# Patient Record
Sex: MALE | Race: WHITE | NOT HISPANIC OR LATINO | Employment: PART TIME | URBAN - METROPOLITAN AREA
[De-identification: names, ages, dates, MRNs, and addresses within clinical notes are randomized per-mention and may not be internally consistent; named-entity substitution may affect disease eponyms.]

---

## 2023-06-26 ENCOUNTER — OFFICE VISIT (OUTPATIENT)
Dept: FAMILY MEDICINE | Facility: CLINIC | Age: 23
End: 2023-06-26
Payer: COMMERCIAL

## 2023-06-26 VITALS
HEART RATE: 82 BPM | SYSTOLIC BLOOD PRESSURE: 129 MMHG | OXYGEN SATURATION: 99 % | HEIGHT: 72 IN | DIASTOLIC BLOOD PRESSURE: 73 MMHG | RESPIRATION RATE: 11 BRPM | WEIGHT: 187.8 LBS | TEMPERATURE: 98.9 F | BODY MASS INDEX: 25.44 KG/M2

## 2023-06-26 DIAGNOSIS — Z23 NEED FOR HPV VACCINE: ICD-10-CM

## 2023-06-26 DIAGNOSIS — F41.9 ANXIETY: ICD-10-CM

## 2023-06-26 DIAGNOSIS — Z00.00 ROUTINE GENERAL MEDICAL EXAMINATION AT A HEALTH CARE FACILITY: Primary | ICD-10-CM

## 2023-06-26 DIAGNOSIS — D22.9 MULTIPLE NEVI: ICD-10-CM

## 2023-06-26 PROCEDURE — 99385 PREV VISIT NEW AGE 18-39: CPT | Mod: 25 | Performed by: FAMILY MEDICINE

## 2023-06-26 PROCEDURE — 90651 9VHPV VACCINE 2/3 DOSE IM: CPT | Performed by: FAMILY MEDICINE

## 2023-06-26 PROCEDURE — 99213 OFFICE O/P EST LOW 20 MIN: CPT | Mod: 25 | Performed by: FAMILY MEDICINE

## 2023-06-26 PROCEDURE — 90471 IMMUNIZATION ADMIN: CPT | Performed by: FAMILY MEDICINE

## 2023-06-26 RX ORDER — MELATONIN 5 MG
TABLET,CHEWABLE ORAL
COMMUNITY

## 2023-06-26 RX ORDER — ESCITALOPRAM OXALATE 5 MG/1
5 TABLET ORAL DAILY
Qty: 30 TABLET | Refills: 1 | Status: SHIPPED | OUTPATIENT
Start: 2023-06-26 | End: 2023-08-20

## 2023-06-26 ASSESSMENT — ENCOUNTER SYMPTOMS
HEMATOCHEZIA: 0
CHILLS: 1
NAUSEA: 1
CONSTIPATION: 0
WEAKNESS: 1
FEVER: 0
NERVOUS/ANXIOUS: 1
DYSURIA: 0
ARTHRALGIAS: 1
DIZZINESS: 0
PARESTHESIAS: 0
EYE PAIN: 0
COUGH: 0
HEARTBURN: 1
FREQUENCY: 0
MYALGIAS: 0
DIARRHEA: 0
HEADACHES: 0
PALPITATIONS: 1
ABDOMINAL PAIN: 0
JOINT SWELLING: 0
SORE THROAT: 0
SHORTNESS OF BREATH: 0
HEMATURIA: 0

## 2023-06-26 ASSESSMENT — ANXIETY QUESTIONNAIRES
GAD7 TOTAL SCORE: 10
GAD7 TOTAL SCORE: 10
7. FEELING AFRAID AS IF SOMETHING AWFUL MIGHT HAPPEN: SEVERAL DAYS
2. NOT BEING ABLE TO STOP OR CONTROL WORRYING: MORE THAN HALF THE DAYS
3. WORRYING TOO MUCH ABOUT DIFFERENT THINGS: MORE THAN HALF THE DAYS
6. BECOMING EASILY ANNOYED OR IRRITABLE: SEVERAL DAYS
IF YOU CHECKED OFF ANY PROBLEMS ON THIS QUESTIONNAIRE, HOW DIFFICULT HAVE THESE PROBLEMS MADE IT FOR YOU TO DO YOUR WORK, TAKE CARE OF THINGS AT HOME, OR GET ALONG WITH OTHER PEOPLE: SOMEWHAT DIFFICULT
5. BEING SO RESTLESS THAT IT IS HARD TO SIT STILL: SEVERAL DAYS
1. FEELING NERVOUS, ANXIOUS, OR ON EDGE: MORE THAN HALF THE DAYS

## 2023-06-26 ASSESSMENT — PATIENT HEALTH QUESTIONNAIRE - PHQ9
5. POOR APPETITE OR OVEREATING: SEVERAL DAYS
SUM OF ALL RESPONSES TO PHQ QUESTIONS 1-9: 6

## 2023-06-26 ASSESSMENT — PAIN SCALES - GENERAL: PAINLEVEL: NO PAIN (0)

## 2023-06-26 NOTE — PATIENT INSTRUCTIONS
HPV vaccine today.    Referral dermatology.     I would recommend a trial of Lexapro 5 mg daily.  This is best taken in AM.  Follow up in 4-6 weeks for recheck mood - this can be a virtual or in person visit.   Consider counseling in the future.        Preventive Health Recommendations  Male Ages 21 - 25     Yearly exam:             See your health care provider every year in order to  o   Review health changes.   o   Discuss preventive care.    o   Review your medicines if your doctor has prescribed any.  You should be tested each year for STDs (sexually transmitted diseases).   Talk to your provider about cholesterol testing.    If you are at risk for diabetes, you should have a diabetes test (fasting glucose).    Shots: Get a flu shot each year. Get a tetanus shot every 10 years.     Nutrition:  Eat at least 5 servings of fruits and vegetables daily.   Eat whole-grain bread, whole-wheat pasta and brown rice instead of white grains and rice.   Get adequate calcium and Vitamin D.     Lifestyle  Exercise for at least 150 minutes a week (30 minutes a day, 5 days a week). This will help you control your weight and prevent disease.   Limit alcohol to one drink per day.   No smoking.   Wear sunscreen to prevent skin cancer.   See your dentist every six months for an exam and cleaning.

## 2023-06-26 NOTE — PROGRESS NOTES
SUBJECTIVE:   CC: Jalen is an 22 year old who presents for preventative health visit.       Healthy Habits:     Getting at least 3 servings of Calcium per day:  Yes    Bi-annual eye exam:  NO    Dental care twice a year:  NO    Sleep apnea or symptoms of sleep apnea:  None    Diet:  Breakfast skipped    Frequency of exercise:  6-7 days/week    Duration of exercise:  Greater than 60 minutes    Taking medications regularly:  Yes    Medication side effects:  None    PHQ-2 Total Score: 2    Additional concerns today:  No    Weight lifting.  - no issues.      Anxiety Follow-Up    How are you doing with your anxiety since your last visit? Worsened     Are you having other symptoms that might be associated with anxiety? No    Have you had a significant life event? No     Are you feeling depressed? No    Do you have any concerns with your use of alcohol or other drugs? No    Social History     Tobacco Use     Smoking status: Never     Smokeless tobacco: Never   Vaping Use     Vaping Use: Never used         6/26/2023    12:36 PM   RUBENS-7 SCORE   Total Score 10         6/26/2023    12:36 PM   PHQ   PHQ-9 Total Score 6   Q9: Thoughts of better off dead/self-harm past 2 weeks Not at all                 6/26/2023    12:36 PM   PHQ   PHQ-9 Total Score 6   Q9: Thoughts of better off dead/self-harm past 2 weeks Not at all         6/26/2023    12:36 PM   RUBENS-7 SCORE   Total Score 10             Have you ever done Advance Care Planning? (For example, a Health Directive, POLST, or a discussion with a medical provider or your loved ones about your wishes): No, advance care planning information given to patient to review.  Patient declined advance care planning discussion at this time.    Social History     Tobacco Use     Smoking status: Never     Smokeless tobacco: Never   Substance Use Topics     Alcohol use: Not on file             6/26/2023    12:30 PM   Alcohol Use   Prescreen: >3 drinks/day or >7 drinks/week? No       Last PSA: No  results found for: PSA    Reviewed orders with patient. Reviewed health maintenance and updated orders accordingly - Yes  BP Readings from Last 3 Encounters:   06/26/23 129/73    Wt Readings from Last 3 Encounters:   06/26/23 85.2 kg (187 lb 12.8 oz)                    Reviewed and updated as needed this visit by clinical staff   Tobacco  Allergies  Meds   Med Hx  Surg Hx  Fam Hx          Reviewed and updated as needed this visit by Provider   Tobacco  Allergies  Meds   Med Hx  Surg Hx  Fam Hx         History reviewed. No pertinent past medical history.   Past Surgical History:   Procedure Laterality Date     UNDESCENDED TESTICLE EXPLORATION      2009,       Review of Systems   Constitutional: Positive for chills. Negative for fever.   HENT: Negative for congestion, ear pain, hearing loss and sore throat.    Eyes: Negative for pain and visual disturbance.   Respiratory: Negative for cough and shortness of breath.    Cardiovascular: Positive for palpitations. Negative for chest pain and peripheral edema.   Gastrointestinal: Positive for heartburn and nausea. Negative for abdominal pain, constipation, diarrhea and hematochezia.   Genitourinary: Negative for dysuria, frequency, genital sores, hematuria, impotence, penile discharge and urgency.   Musculoskeletal: Positive for arthralgias. Negative for joint swelling and myalgias.   Skin: Negative for rash.   Neurological: Positive for weakness. Negative for dizziness, headaches and paresthesias.   Psychiatric/Behavioral: Positive for mood changes. The patient is nervous/anxious.      Chills - with anxiety  Heart racing with anxiety -   Heartburn, nausea - heightened gag reflex.  In AM - worse gag symptoms.  No at night symptoms.   gerd as infant - occasionally uses acid reducer   Occasional right knee pain with activity - lunge type.  Left hip pain   Paralyzed feeling with anxiety symptoms.    Did have medication in past - did not take it regularly.   "    Numerous moles.  Some new ones present.        OBJECTIVE:   /73 (BP Location: Right arm, Patient Position: Sitting, Cuff Size: Adult Large)   Pulse 82   Temp 98.9  F (37.2  C) (Oral)   Resp 11   Ht 1.836 m (6' 0.29\")   Wt 85.2 kg (187 lb 12.8 oz)   SpO2 99%   BMI 25.27 kg/m      Physical Exam  GENERAL: healthy, alert and no distress  EYES: Eyes grossly normal to inspection, PERRL and conjunctivae and sclerae normal  HENT: ear canals and TM's normal, nose and mouth without ulcers or lesions  NECK: no adenopathy, no asymmetry, masses, or scars and thyroid normal to palpation  RESP: lungs clear to auscultation - no rales, rhonchi or wheezes  CV: regular rate and rhythm, normal S1 S2, no S3 or S4, no murmur, click or rub, no peripheral edema and peripheral pulses strong  ABDOMEN: soft, nontender, no hepatosplenomegaly, no masses and bowel sounds normal   (male): normal male genitalia without lesions or urethral discharge, no hernia  MS: no gross musculoskeletal defects noted, no edema  SKIN: no suspicious lesions or rashes.  Numerous scattered nevi noted.   NEURO: Normal strength and tone, mentation intact and speech normal  PSYCH: mentation appears normal, affect normal/bright, anxious, judgement and insight intact and appearance well groomed  LYMPH: no cervical, supraclavicular, axillary, or inguinal adenopathy        ASSESSMENT/PLAN:   (Z00.00) Routine general medical examination at a health care facility  (primary encounter diagnosis)  Comment:   Plan: screening and preventative care discussed    (F41.9) Anxiety  Comment: has taken zoloft in past - no side effects but did not take regularly.  Mother has used lexapro with good effects.  Plan: escitalopram (LEXAPRO) 5 MG tablet        Begin Lexapro as given.  Follow up in 4-6 weeks.  Sooner for worsening symptoms or side effects.  No depression symptoms.      (D22.9) Multiple nevi  Comment: multiple nevi.  Plan: Adult Dermatology Referral        " Skin screening planned.  Referral to dermatology.      (Z23) Need for HPV vaccine  Comment: 3rd in series due.  Plan: HPV9 (GARDASIL 9)              Patient has been advised of split billing requirements and indicates understanding: Yes      COUNSELING:   Reviewed preventive health counseling, as reflected in patient instructions       Regular exercise       Healthy diet/nutrition       Vision screening       Immunizations    Declined: Covid-19 due to Concerns about side effects/safety               Safe sex practices/STD prevention        He reports that he has never smoked. He has never used smokeless tobacco.        Navya Degroot MD  [facility]        Patient Instructions   HPV vaccine today.    Referral dermatology.     I would recommend a trial of Lexapro 5 mg daily.  This is best taken in AM.  Follow up in 4-6 weeks for recheck mood - this can be a virtual or in person visit.   Consider counseling in the future.

## 2023-06-26 NOTE — NURSING NOTE
Prior to immunization administration, verified patients identity using patient s name and date of birth. Please see Immunization Activity for additional information.     Screening Questionnaire for Adult Immunization    Are you sick today?   No   Do you have allergies to medications, food, a vaccine component or latex?   No   Have you ever had a serious reaction after receiving a vaccination?   No   Do you have a long-term health problem with heart, lung, kidney, or metabolic disease (e.g., diabetes), asthma, a blood disorder, no spleen, complement component deficiency, a cochlear implant, or a spinal fluid leak?  Are you on long-term aspirin therapy?   No   Do you have cancer, leukemia, HIV/AIDS, or any other immune system problem?   No   Do you have a parent, brother, or sister with an immune system problem?   No   In the past 3 months, have you taken medications that affect  your immune system, such as prednisone, other steroids, or anticancer drugs; drugs for the treatment of rheumatoid arthritis, Crohn s disease, or psoriasis; or have you had radiation treatments?   No   Have you had a seizure, or a brain or other nervous system problem?   No   During the past year, have you received a transfusion of blood or blood    products, or been given immune (gamma) globulin or antiviral drug?   No   For women: Are you pregnant or is there a chance you could become       pregnant during the next month?   No   Have you received any vaccinations in the past 4 weeks?   No     Immunization questionnaire answers were all negative.      Patient instructed to remain in clinic for 15 minutes afterwards, and to report any adverse reactions.     Screening performed by Lyn Matthews MA on 6/26/2023 at 1:51 PM.

## 2023-08-16 DIAGNOSIS — F41.9 ANXIETY: ICD-10-CM

## 2023-08-16 NOTE — TELEPHONE ENCOUNTER
Patient needs to be seen by Navya Degroot MD  (provider or resource)  Is there a time frame in which the patient should be seen Yes: within month - ok so telephone/video or evisit  What does the patient need to be seen regarding anxiety, depression   Does patient need to come fasting No  Phone: [phone] (home)  When workflow completed Route to provider if refill needed to get to follow up appointment.    Clinic: [facility] at [phone]    'Hi, this is (your name) I am calling from (provider's name) office. After reviewing your chart, (Provider's name) has indicated that you need an appointment to review (reason for visit). May I assist you in making this appointment? (Please contact us via [employer] or by phone at (Clinic name and Phone number) to schedule this appointment at your earliest convenience)'    Was first outreach attempted?No  If yes, the Second attempt due on:             6/26/2023    12:36 PM   PHQ   PHQ-9 Total Score 6   Q9: Thoughts of better off dead/self-harm past 2 weeks Not at all         6/26/2023    12:36 PM   RUBENS-7 SCORE   Total Score 10

## 2023-08-17 NOTE — TELEPHONE ENCOUNTER
Called Pt regarding message Pt's mother answered she stated that she will let him know and [facility] will [facility] no further needs at this time.         Jessica [facility] Visit Facilitator

## 2023-08-20 RX ORDER — ESCITALOPRAM OXALATE 5 MG/1
5 TABLET ORAL DAILY
Qty: 30 TABLET | Refills: 0 | Status: SHIPPED | OUTPATIENT
Start: 2023-08-20 | End: 2023-08-30

## 2023-08-25 ENCOUNTER — MYC REFILL (OUTPATIENT)
Dept: FAMILY MEDICINE | Facility: CLINIC | Age: 23
End: 2023-08-25
Payer: COMMERCIAL

## 2023-08-25 DIAGNOSIS — F41.9 ANXIETY: ICD-10-CM

## 2023-08-25 RX ORDER — ESCITALOPRAM OXALATE 5 MG/1
5 TABLET ORAL DAILY
Qty: 30 TABLET | Refills: 0 | OUTPATIENT
Start: 2023-08-25

## 2023-08-30 ENCOUNTER — VIRTUAL VISIT (OUTPATIENT)
Dept: FAMILY MEDICINE | Facility: CLINIC | Age: 23
End: 2023-08-30
Payer: COMMERCIAL

## 2023-08-30 DIAGNOSIS — F41.9 ANXIETY: Primary | ICD-10-CM

## 2023-08-30 PROCEDURE — 99213 OFFICE O/P EST LOW 20 MIN: CPT | Mod: VID | Performed by: FAMILY MEDICINE

## 2023-08-30 RX ORDER — ESCITALOPRAM OXALATE 5 MG/1
5 TABLET ORAL DAILY
Qty: 90 TABLET | Refills: 1 | Status: SHIPPED | OUTPATIENT
Start: 2023-08-30 | End: 2024-04-03

## 2023-08-30 ASSESSMENT — ANXIETY QUESTIONNAIRES
5. BEING SO RESTLESS THAT IT IS HARD TO SIT STILL: SEVERAL DAYS
1. FEELING NERVOUS, ANXIOUS, OR ON EDGE: NOT AT ALL
7. FEELING AFRAID AS IF SOMETHING AWFUL MIGHT HAPPEN: NOT AT ALL
GAD7 TOTAL SCORE: 3
GAD7 TOTAL SCORE: 3
2. NOT BEING ABLE TO STOP OR CONTROL WORRYING: NOT AT ALL
6. BECOMING EASILY ANNOYED OR IRRITABLE: SEVERAL DAYS
3. WORRYING TOO MUCH ABOUT DIFFERENT THINGS: SEVERAL DAYS
4. TROUBLE RELAXING: NOT AT ALL

## 2023-08-30 ASSESSMENT — PATIENT HEALTH QUESTIONNAIRE - PHQ9: SUM OF ALL RESPONSES TO PHQ QUESTIONS 1-9: 1

## 2023-08-30 NOTE — PROGRESS NOTES
"Jalen is a 23 year old who is being evaluated via a billable video visit.      How would you like to obtain your AVS? MyChart  If the video visit is dropped, the invitation should be resent by: Text to cell phone: [phone]  Will anyone else be joining your video visit? No          Assessment & Plan     Anxiety  Under good control with current symptoms.  Continue current lexapro.  Refills updated.  Follow up in 6 months or sooner if symptoms worsen or side effects occur.    - escitalopram (LEXAPRO) 5 MG tablet; Take 1 tablet (5 mg) by mouth daily    Prescription drug management         BMI:   Estimated body mass index is 25.27 kg/m  as calculated from the following:    Height as of 6/26/23: 1.836 m (6' 0.29\").    Weight as of 6/26/23: 85.2 kg (187 lb 12.8 oz).   Weight management plan: Discussed healthy diet and exercise guidelines    Patient Instructions   Continue current Lexapro dose.  Refills updated for you.    Please follow up in 6 months or sooner if worsening symptoms or other concerns.    Navya Degroot MD  [facility]    Bonnie Rao is a 23 year old, presenting for the following health issues:  Anxiety      8/30/2023    10:02 AM   Additional Questions   Roomed by Briana       History of Present Illness       Mental Health Follow-up:  Patient presents to follow-up on Anxiety.    Patient's anxiety since last visit has been:  Better  The patient is not having other symptoms associated with anxiety.  Any significant life events: No  Patient is not feeling anxious or having panic attacks.  Patient has no concerns about alcohol or drug use.    He eats 0-1 servings of fruits and vegetables daily.He consumes 1 sweetened beverage(s) daily.He exercises with enough effort to increase his heart rate 60 or more minutes per day.  He exercises with enough effort to increase his heart rate 7 days per week.   He is taking medications regularly.       Anxiety Follow-Up  How are you doing with " your anxiety since your last visit? Improved   Are you having other symptoms that might be associated with anxiety? No  Have you had a significant life event? No   Are you feeling depressed? No  Do you have any concerns with your use of alcohol or other drugs? No  Denies side effects - mild nausea at first.   Sleep - more but due to end of summer.  No concerns.  Well rested in AM.  Social History     Tobacco Use    Smoking status: Never    Smokeless tobacco: Never   Vaping Use    Vaping Use: Never used         6/26/2023    12:36 PM 8/30/2023    10:05 AM   RUBENS-7 SCORE   Total Score  3 (minimal anxiety)   Total Score 10 3         6/26/2023    12:36 PM 8/30/2023    10:05 AM   PHQ   PHQ-9 Total Score 6 1   Q9: Thoughts of better off dead/self-harm past 2 weeks Not at all Not at all             Review of Systems   Constitutional, HEENT, cardiovascular, pulmonary, gi and gu systems are negative, except as otherwise noted.      Objective           Vitals:  No vitals were obtained today due to virtual visit.    Physical Exam   GENERAL: Healthy, alert and no distress  EYES: Eyes grossly normal to inspection.  No discharge or erythema, or obvious scleral/conjunctival abnormalities.  RESP: No audible wheeze, cough, or visible cyanosis.  No visible retractions or increased work of breathing.    SKIN: Visible skin clear. No significant rash, abnormal pigmentation or lesions.  NEURO: Cranial nerves grossly intact.  Mentation and speech appropriate for age.  PSYCH: Mentation appears normal, affect normal/bright, judgement and insight intact, normal speech and appearance well-groomed.                Video-Visit Details    Type of service:  Video Visit     Originating Location (pt. Location): Home    Distant Location (provider location):  Off-site  Platform used for Video Visit: [employer]

## 2023-08-30 NOTE — PATIENT INSTRUCTIONS
Continue current Lexapro dose.  Refills updated for you.    Please follow up in 6 months or sooner if worsening symptoms or other concerns.

## 2023-09-18 ENCOUNTER — OFFICE VISIT (OUTPATIENT)
Dept: FAMILY MEDICINE | Facility: CLINIC | Age: 23
End: 2023-09-18
Payer: COMMERCIAL

## 2023-09-18 DIAGNOSIS — D22.9 MULTIPLE NEVI: ICD-10-CM

## 2023-09-18 PROCEDURE — 99203 OFFICE O/P NEW LOW 30 MIN: CPT | Performed by: PHYSICIAN ASSISTANT

## 2023-09-18 ASSESSMENT — PAIN SCALES - GENERAL: PAINLEVEL: NO PAIN (0)

## 2023-09-18 NOTE — LETTER
9/18/2023         RE: Jalen Lomas  [address]  [place] [address]        Dear Colleague,    Thank you for referring your patient, Jalen Lomas, to the [facility] [place]. Please see a copy of my visit note below.    [facility] Dermatology Note  Encounter Date: Sep 18, 2023  Office Visit      Dermatology Problem List:  FBSE 9/18/23  ____________________________________________    Assessment & Plan:  # Multiple clinically benign nevi on the trunk and face. Patient feels self conscious about moles on the face.   - discussed shave removal. Because these are relatively flat I thin likely zhang of scar on socrates is good. Discussed benign etiology  - patient defers treatment for now and will reconsider in future if lesion become more raised.     Procedures Performed:   none    Follow-up: prn for new or changing lesions    Staff:     All risks, benefits and alternatives were discussed with patient.  Patient is in agreement and understands the assessment and plan.  All questions were answered.    Valery Castellanos PA-C, MPAS  [facility]: Phone: [phone], Fax: [phone]  [facility]: Phone: [phone],  Fax: [phone]  [facility] [place]: Phone: [phone], Fax: [phone]  ____________________________________________    CC: Skin Check (FBSE)      HPI:  Mr. Jalen Lomas is a 23 year old male who presents today as a new patient for mole check. No hx skin cancer and no fhx melanoma. Present with his mother. Has several moles on face he is wondering if he can get removed.     Patient is otherwise feeling well, without additional concerns.    Labs:  none    Physical Exam:  Vitals: There were no vitals taken for this visit.  SKIN: Full skin, which includes the head/face, both arms, chest, back, abdomen,both legs, genitalia and/or groin buttocks, digits and/or nails, was  examined.   - Multiple regular brown pigmented macules and papules are identified on the trunk and face.   - No other lesions of concern on areas examined.     Medications:  Current Outpatient Medications   Medication     escitalopram (LEXAPRO) 5 MG tablet     Melatonin 5 MG CHEW     No current facility-administered medications for this visit.      Past Medical/Surgical History:   There is no problem list on file for this patient.    History reviewed. No pertinent past medical history.                     Again, thank you for allowing me to participate in the care of your patient.        Sincerely,        Valery Castellanos PA-C

## 2023-09-18 NOTE — PATIENT INSTRUCTIONS
Patient Education       Proper skin care from [facility]:    -Eliminate harsh soaps as they strip the natural oils from the skin, often resulting in dry itchy skin ( i.e. Dial, Zest, Greenlandic Spring)  -Use mild soaps such as Cetaphil or Dove Sensitive Skin in the shower. You do not need to use soap on arms, legs, and trunk every time you shower unless visibly soiled.   -Avoid hot or cold showers.  -After showering, lightly dry off and apply moisturizing within 2-3 minutes. This will help trap moisture in the skin.   -Aggressive use of a moisturizer at least 1-2 times a day to the entire body (including -Vanicream, Cetaphil, Aquaphor or Cerave) and moisturize hands after every washing.  -We recommend using moisturizers that come in a tub that needs to be scooped out, not a pump. This has more of an oil base. It will hold moisture in your skin much better than a water base moisturizer. The above recommended are non-pore clogging.      Wear a sunscreen with at least SPF 30 on your face, ears, neck and V of the chest daily. Wear sunscreen on other areas of the body if those areas are exposed to the sun throughout the day. Sunscreens can contain physical and/or chemical blockers. Physical blockers are less likely to clog pores, these include zinc oxide and titanium dioxide. Reapply every two hour and after swimming.     Sunscreen examples: https://www.ewg.org/sunscreen/    UV radiation  UVA radiation remains constant throughout the day and throughout the year. It is a longer wavelength than UVB and therefore penetrates deeper into the skin leading to immediate and delayed tanning, photoaging, and skin cancer. 70-80% of UVA and UVB radiation occurs between the hours of 10am-2pm.  UVB radiation  UVB radiation causes the most harmful effects and is more significant during the summer months. However, snow and ice can reflect UVB radiation leading to skin damage during the winter months as well. UVB radiation is  responsible for tanning, burning, inflammation, delayed erythema (pinkness), pigmentation (brown spots), and skin cancer.     I recommend self monthly full body exams and yearly full body exams with a dermatology provider. If you develop a new or changing lesion please follow up for examination. Most skin cancers are pink and scaly or pink and pearly. However, we do see blue/brown/black skin cancers.  Consider the ABCDEs of melanoma when giving yourself your monthly full body exam ( don't forget the groin, buttocks, feet, toes, etc). A-asymmetry, B-borders, C-color, D-diameter, E-elevation or evolving. If you see any of these changes please follow up in clinic. If you cannot see your back I recommend purchasing a hand held mirror to use with a larger wall mirror.       Checking for Skin Cancer  You can find cancer early by checking your skin each month. There are 3 kinds of skin cancer. They are melanoma, basal cell carcinoma, and squamous cell carcinoma. Doing monthly skin checks is the best way to find new marks or skin changes. Follow the instructions below for checking your skin.   The ABCDEs of checking moles for melanoma   Check your moles or growths for signs of melanoma using ABCDE:   Asymmetry: the sides of the mole or growth don t match  Border: the edges are ragged, notched, or blurred  Color: the color within the mole or growth varies  Diameter: the mole or growth is larger than 6 mm (size of a pencil eraser)  Evolving: the size, shape, or color of the mole or growth is changing (evolving is not shown in the images below)    Checking for other types of skin cancer  Basal cell carcinoma or squamous cell carcinoma have symptoms such as:     A spot or mole that looks different from all other marks on your skin  Changes in how an area feels, such as itching, tenderness, or pain  Changes in the skin's surface, such as oozing, bleeding, or scaliness  A sore that does not heal  New swelling or redness beyond  the border of a mole    Who s at risk?  Anyone can get skin cancer. But you are at greater risk if you have:   Fair skin, light-colored hair, or light-colored eyes  Many moles or abnormal moles on your skin  A history of sunburns from sunlight or tanning beds  A family history of skin cancer  A history of exposure to radiation or chemicals  A weakened immune system  If you have had skin cancer in the past, you are at risk for recurring skin cancer.   How to check your skin  Do your monthly skin checkups in front of a full-length mirror. Check all parts of your body, including your:   Head (ears, face, neck, and scalp)  Torso (front, back, and sides)  Arms (tops, undersides, upper, and lower armpits)  Hands (palms, backs, and fingers, including under the nails)  Buttocks and genitals  Legs (front, back, and sides)  Feet (tops, soles, toes, including under the nails, and between toes)  If you have a lot of moles, take digital photos of them each month. Make sure to take photos both up close and from a distance. These can help you see if any moles change over time.   Most skin changes are not cancer. But if you see any changes in your skin, call your doctor right away. Only he or she can diagnose a problem. If you have skin cancer, seeing your doctor can be the first step toward getting the treatment that could save your life.   [employer] last reviewed this educational content on 4/1/2019 2000-2020 [employer]. [address] [place] [address], [place] [address]. All rights reserved. This information is not intended as a substitute for professional medical care. Always follow your healthcare professional's instructions.       When should I call my doctor?  If you are worsening or not improving, please, contact us or seek urgent care as noted below.     Who should I call with questions (adults)?  [facility] (adult and pediatric): [phone]  [facility]  [place] (adult): [phone]  [facility] ([place], [place], [place] and [place]) [phone]  For urgent needs outside of business hours call the [facility] at [phone] and ask for the dermatology resident on call to be paged  If this is a medical emergency and you are unable to reach an ER, Call 911      If you need a prescription refill, please contact your pharmacy. Refills are approved or denied by our Physicians during normal business hours, Monday through Fridays  Per office policy, refills will not be granted if you have not been seen within the past year (or sooner depending on your child's condition)

## 2023-09-18 NOTE — PROGRESS NOTES
[facility] Note  Encounter Date: Sep 18, 2023  Office Visit      Dermatology Problem List:  FBSE 9/18/23  ____________________________________________    Assessment & Plan:  # Multiple clinically benign nevi on the trunk and face. Patient feels self conscious about moles on the face.   - discussed shave removal. Because these are relatively flat I thin likely zhang of scar on socrates is good. Discussed benign etiology  - patient defers treatment for now and will reconsider in future if lesion become more raised.     Procedures Performed:   none    Follow-up: prn for new or changing lesions    Staff:     All risks, benefits and alternatives were discussed with patient.  Patient is in agreement and understands the assessment and plan.  All questions were answered.    Valery Castellanos PA-C, MPAS  [facility]: Phone: [phone], Fax: [phone]  [facility]: Phone: [phone],  Fax: [phone]  [facility]: Phone: [phone], Fax: [phone]  ____________________________________________    CC: Skin Check (FBSE)      HPI:  Mr. Jalen Lomas is a 23 year old male who presents today as a new patient for mole check. No hx skin cancer and no fhx melanoma. Present with his mother. Has several moles on face he is wondering if he can get removed.     Patient is otherwise feeling well, without additional concerns.    Labs:  none    Physical Exam:  Vitals: There were no vitals taken for this visit.  SKIN: Full skin, which includes the head/face, both arms, chest, back, abdomen,both legs, genitalia and/or groin buttocks, digits and/or nails, was examined.   - Multiple regular brown pigmented macules and papules are identified on the trunk and face.   - No other lesions of concern on areas examined.     Medications:  Current Outpatient Medications   Medication    escitalopram (LEXAPRO) 5  MG tablet    Melatonin 5 MG CHEW     No current facility-administered medications for this visit.      Past Medical/Surgical History:   There is no problem list on file for this patient.    History reviewed. No pertinent past medical history.

## 2024-04-03 ENCOUNTER — VIRTUAL VISIT (OUTPATIENT)
Dept: FAMILY MEDICINE | Facility: CLINIC | Age: 24
End: 2024-04-03
Attending: FAMILY MEDICINE
Payer: COMMERCIAL

## 2024-04-03 DIAGNOSIS — F41.9 ANXIETY: ICD-10-CM

## 2024-04-03 PROCEDURE — 99214 OFFICE O/P EST MOD 30 MIN: CPT | Mod: 95 | Performed by: FAMILY MEDICINE

## 2024-04-03 RX ORDER — ESCITALOPRAM OXALATE 10 MG/1
10 TABLET ORAL DAILY
Qty: 90 TABLET | Refills: 1 | Status: SHIPPED | OUTPATIENT
Start: 2024-04-03 | End: 2024-10-07

## 2024-04-03 ASSESSMENT — ANXIETY QUESTIONNAIRES
1. FEELING NERVOUS, ANXIOUS, OR ON EDGE: SEVERAL DAYS
7. FEELING AFRAID AS IF SOMETHING AWFUL MIGHT HAPPEN: SEVERAL DAYS
7. FEELING AFRAID AS IF SOMETHING AWFUL MIGHT HAPPEN: SEVERAL DAYS
4. TROUBLE RELAXING: SEVERAL DAYS
GAD7 TOTAL SCORE: 6
GAD7 TOTAL SCORE: 6
2. NOT BEING ABLE TO STOP OR CONTROL WORRYING: NOT AT ALL
6. BECOMING EASILY ANNOYED OR IRRITABLE: SEVERAL DAYS
8. IF YOU CHECKED OFF ANY PROBLEMS, HOW DIFFICULT HAVE THESE MADE IT FOR YOU TO DO YOUR WORK, TAKE CARE OF THINGS AT HOME, OR GET ALONG WITH OTHER PEOPLE?: SOMEWHAT DIFFICULT
5. BEING SO RESTLESS THAT IT IS HARD TO SIT STILL: SEVERAL DAYS
IF YOU CHECKED OFF ANY PROBLEMS ON THIS QUESTIONNAIRE, HOW DIFFICULT HAVE THESE PROBLEMS MADE IT FOR YOU TO DO YOUR WORK, TAKE CARE OF THINGS AT HOME, OR GET ALONG WITH OTHER PEOPLE: SOMEWHAT DIFFICULT
3. WORRYING TOO MUCH ABOUT DIFFERENT THINGS: SEVERAL DAYS

## 2024-04-03 ASSESSMENT — PATIENT HEALTH QUESTIONNAIRE - PHQ9: SUM OF ALL RESPONSES TO PHQ QUESTIONS 1-9: 3

## 2024-04-03 NOTE — PATIENT INSTRUCTIONS
Increase escitalopram to 10 mg daily.  If side effects occur or failure to control symptoms, follow-up is recommended.  If symptoms are well-controlled with this new dosage, follow-up would be recommended in 6 months.  This can be done with your annual physical examination for and in person visit.

## 2024-04-03 NOTE — PROGRESS NOTES
"    Instructions Relayed to Patient by Virtual Roomer:     Patient is active on [employer]:   Relayed following to patient: \"It looks like you are active on [employer], are you able to join the visit this way? If not, do you need us to send you a link now or would you like your provider to send a link via text or email when they are ready to initiate the visit?\"    Reminded patient to ensure they were logged on to virtual visit by arrival time listed. Documented in appointment notes if patient had flexibility to initiate visit sooner than arrival time. If pediatric virtual visit, ensured pediatric patient along with parent/guardian will be present for video visit.     Patient offered the website [email or web address] and/or phone number to [employer] Help line: [phone]     Jalen is a 23 year old who is being evaluated via a billable video visit.    How would you like to obtain your AVS? Tipbit  If the video visit is dropped, the invitation should be resent by: Text to cell phone: [phone]  Will anyone else be joining your video visit? No      Assessment & Plan     Anxiety  Symptoms have been improved on the 5 mg of escitalopram.  There are times when symptoms are not completely controlled then he may benefit from increasing to 10 mg daily.  Prescription for 10 mg is sent to the pharmacy.  If he has side effects he will break the pill in half and return to the 5 mg and let me know.  He will follow-up with failure to control symptoms with this dosage or if new symptoms present.  Otherwise follow-up in 6 months with physical and reassessment of anxiety at that time.  - escitalopram (LEXAPRO) 10 MG tablet; Take 1 tablet (10 mg) by mouth daily    Prescription drug management        BMI  Estimated body mass index is 25.27 kg/m  as calculated from the following:    Height as of 6/26/23: 1.836 m (6' 0.29\").    Weight as of 6/26/23: 85.2 kg (187 lb 12.8 oz).   Weight management plan: Discussed healthy diet and " exercise guidelines      Patient Instructions   Increase escitalopram to 10 mg daily.  If side effects occur or failure to control symptoms, follow-up is recommended.  If symptoms are well-controlled with this new dosage, follow-up would be recommended in 6 months.  This can be done with your annual physical examination for and in person visit.    Bonnie Rao is a 23 year old, presenting for the following health issues:  Recheck Medication (Anxiety and Depression Check in )      4/3/2024    10:34 AM   Additional Questions   Roomed by Diane ESTEBAN   Accompanied by Self     History of Present Illness       Mental Health Follow-up:  Patient presents to follow-up on Anxiety.    Patient's anxiety since last visit has been:  Better  The patient is not having other symptoms associated with anxiety.  Any significant life events: relationship concerns  Patient is feeling anxious or having panic attacks.  Patient has no concerns about alcohol or drug use.          6/26/2023    12:36 PM 8/30/2023    10:05 AM 4/3/2024    10:30 AM   PHQ   PHQ-9 Total Score 6 1 3   Q9: Thoughts of better off dead/self-harm past 2 weeks Not at all Not at all Not at all         6/26/2023    12:36 PM 8/30/2023    10:05 AM 4/3/2024    10:30 AM   RUBENS-7 SCORE   Total Score  3 (minimal anxiety) 6 (mild anxiety)   Total Score 10 3 6     Does feel like he has gotten significant improvement with the escitalopram at 5 mg.  There are times when his symptoms continue to be troublesome.  He reports that he was out of his 5 mg pills for 2 days and did take mother's 10 mg dose and felt more controlled.  Denies negative side effects.  Sleeping well.               Review of Systems  Constitutional, HEENT, cardiovascular, pulmonary, gi and gu systems are negative, except as otherwise noted.      Objective           Vitals:  No vitals were obtained today due to virtual visit.    Physical Exam   GENERAL: alert and no distress  EYES: Eyes grossly normal to  inspection.  No discharge or erythema, or obvious scleral/conjunctival abnormalities.  RESP: No audible wheeze, cough, or visible cyanosis.    SKIN: Visible skin clear. No significant rash, abnormal pigmentation or lesions.  NEURO: Cranial nerves grossly intact.  Mentation and speech appropriate for age.  PSYCH: Appropriate affect, tone, and pace of words          Video-Visit Details    Type of service:  Video Visit   Originating Location (pt. Location): Home    Distant Location (provider location):  Off-site  Platform used for Video Visit: [employer]Well  Signed Electronically by: Navya Degroot MD

## 2024-09-08 ENCOUNTER — HEALTH MAINTENANCE LETTER (OUTPATIENT)
Age: 24
End: 2024-09-08

## 2024-10-06 DIAGNOSIS — F41.9 ANXIETY: ICD-10-CM

## 2024-10-07 RX ORDER — ESCITALOPRAM OXALATE 10 MG/1
10 TABLET ORAL DAILY
Qty: 30 TABLET | Refills: 0 | Status: SHIPPED | OUTPATIENT
Start: 2024-10-07 | End: 2024-11-08

## 2024-10-07 NOTE — TELEPHONE ENCOUNTER
6/26/2023    12:36 PM 8/30/2023    10:05 AM 4/3/2024    10:30 AM   PHQ   PHQ-9 Total Score 6 1 3   Q9: Thoughts of better off dead/self-harm past 2 weeks Not at all Not at all Not at all         6/26/2023    12:36 PM 8/30/2023    10:05 AM 4/3/2024    10:30 AM   RUBENS-7 SCORE   Total Score  3 (minimal anxiety) 6 (mild anxiety)   Total Score 10 3 6       Refill sent.  Follow up needed for additional refills - message on script.     PSK

## 2024-11-02 DIAGNOSIS — F41.9 ANXIETY: ICD-10-CM

## 2024-11-06 NOTE — TELEPHONE ENCOUNTER
Patient needs to be seen by Navya Degroot MD  (provider or resource)  Is there a time frame in which the patient should be seen Yes: within month - virtual ok  What does the patient need to be seen regarding mood   Does patient need to come fasting No  Phone: [phone] (home)  When workflow completed Route to provider    Clinic: [facility] at [phone]    'Hi, this is (your name) I am calling from (provider's name) office. After reviewing your chart, (Provider's name) has indicated that you need an appointment to review (reason for visit). May I assist you in making this appointment? (Please contact us via [employer] or by phone at (Clinic name and Phone number) to schedule this appointment at your earliest convenience)'    Was first outreach attempted?No  If yes, the Second attempt due on:

## 2024-11-08 RX ORDER — ESCITALOPRAM OXALATE 10 MG/1
10 TABLET ORAL DAILY
Qty: 90 TABLET | Refills: 0 | Status: SHIPPED | OUTPATIENT
Start: 2024-11-08

## 2024-12-04 ENCOUNTER — VIRTUAL VISIT (OUTPATIENT)
Dept: FAMILY MEDICINE | Facility: CLINIC | Age: 24
End: 2024-12-04
Payer: COMMERCIAL

## 2024-12-04 DIAGNOSIS — F41.9 ANXIETY: ICD-10-CM

## 2024-12-04 PROCEDURE — G2211 COMPLEX E/M VISIT ADD ON: HCPCS | Mod: 95 | Performed by: FAMILY MEDICINE

## 2024-12-04 PROCEDURE — 99214 OFFICE O/P EST MOD 30 MIN: CPT | Mod: 95 | Performed by: FAMILY MEDICINE

## 2024-12-04 RX ORDER — ESCITALOPRAM OXALATE 20 MG/1
20 TABLET ORAL DAILY
Qty: 90 TABLET | Refills: 1 | Status: SHIPPED | OUTPATIENT
Start: 2024-12-04

## 2024-12-04 ASSESSMENT — ANXIETY QUESTIONNAIRES
6. BECOMING EASILY ANNOYED OR IRRITABLE: SEVERAL DAYS
GAD7 TOTAL SCORE: 2
8. IF YOU CHECKED OFF ANY PROBLEMS, HOW DIFFICULT HAVE THESE MADE IT FOR YOU TO DO YOUR WORK, TAKE CARE OF THINGS AT HOME, OR GET ALONG WITH OTHER PEOPLE?: NOT DIFFICULT AT ALL
IF YOU CHECKED OFF ANY PROBLEMS ON THIS QUESTIONNAIRE, HOW DIFFICULT HAVE THESE PROBLEMS MADE IT FOR YOU TO DO YOUR WORK, TAKE CARE OF THINGS AT HOME, OR GET ALONG WITH OTHER PEOPLE: NOT DIFFICULT AT ALL
GAD7 TOTAL SCORE: 2
2. NOT BEING ABLE TO STOP OR CONTROL WORRYING: NOT AT ALL
GAD7 TOTAL SCORE: 2
5. BEING SO RESTLESS THAT IT IS HARD TO SIT STILL: NOT AT ALL
4. TROUBLE RELAXING: NOT AT ALL
7. FEELING AFRAID AS IF SOMETHING AWFUL MIGHT HAPPEN: NOT AT ALL
7. FEELING AFRAID AS IF SOMETHING AWFUL MIGHT HAPPEN: NOT AT ALL
3. WORRYING TOO MUCH ABOUT DIFFERENT THINGS: SEVERAL DAYS
1. FEELING NERVOUS, ANXIOUS, OR ON EDGE: NOT AT ALL

## 2024-12-04 NOTE — PROGRESS NOTES
"  If patient has telephone visit, have they been educated on video visit as preferred visit method and offered to change to video visit? NOT APPLICABLE        Instructions Relayed to Patient by Virtual Roomer:     Patient is active on [email or web address]:   Relayed following to patient: \"It looks like you are active on [email or web address], are you able to join the visit this way? If not, do you need us to send you a link now or would you like your provider to send a link via text or email when they are ready to initiate the visit?\"      Patient Confirmed they will join visit via: [employer]un  Reminded patient to ensure they were logged on to virtual visit by arrival time listed.   Asked if patient has flexibility to initiate visit sooner than arrival time: patient is unable to initiate visit earlier than arrival time     If pediatric virtual visit, ensured pediatric patient along with parent/guardian will be present for video visit.     Patient offered the website [email or web address] and/or phone number to [email or web address] Help line: [phone]      Jalen is a 24 year old who is being evaluated via a billable video visit.    How would you like to obtain your AVS? [employer]  If the video visit is dropped, the invitation should be resent by: Text to cell phone: [phone]  Will anyone else be joining your video visit? No      Assessment & Plan     Anxiety  Symptoms improved but not fully controlled.   Increase to 20 mg daily and attempt to transition to at night dosing to decrease change timing of potential side effect.  Follow up if symptoms are not controlled or side effects worsen.    - escitalopram (LEXAPRO) 20 MG tablet; Take 1 tablet (20 mg) by mouth daily.            Patient Instructions   Increase Escitalopram to 20 mg daily.  Follow up  in 4-6 weeks if symptoms are not well controlled on this dosage.    You can take this at night to see if that diminishes the nausea side effect you have noted.       The longitudinal plan of " care for the diagnosis(es)/condition(s) as documented were addressed during this visit. Due to the added complexity in care, I will continue to support Jalen in the subsequent management and with ongoing continuity of care.          Bonnie Rao is a 24 year old, presenting for the following health issues:  Anxiety        12/4/2024     8:33 AM   Additional Questions   Roomed by Clary   Accompanied by self     History of Present Illness       Mental Health Follow-up:  Patient presents to follow-up on Anxiety.    Patient's anxiety since last visit has been:  Better  The patient is not having other symptoms associated with anxiety.  Any significant life events: No  Patient is not feeling anxious or having panic attacks.  Patient has no concerns about alcohol or drug use.    He eats 0-1 servings of fruits and vegetables daily.He consumes 1 sweetened beverage(s) daily.He exercises with enough effort to increase his heart rate 60 or more minutes per day.  He exercises with enough effort to increase his heart rate 6 days per week.   He is taking medications regularly.     Continues with anxiety that occurs with certain things - overthinking.  Worse if in a relationship.    No symptoms like a panic attack since prior to medication use.   Side effects: 1 pm nausea for 10-15 min.  Taking pill at noon-1 pm.      Anxiety   How are you doing with your anxiety since your last visit?  Improved - some ongoing symptoms noted.  Are you having other symptoms that might be associated with depression or anxiety? No  Have you had a significant life event? No   Do you have any concerns with your use of alcohol or other drugs? No    Social History     Tobacco Use    Smoking status: Never    Smokeless tobacco: Never   Vaping Use    Vaping status: Never Used         6/26/2023    12:36 PM 8/30/2023    10:05 AM 4/3/2024    10:30 AM   PHQ   PHQ-9 Total Score 6 1 3   Q9: Thoughts of better off dead/self-harm past 2 weeks Not at all Not at  all Not at all         8/30/2023    10:05 AM 4/3/2024    10:30 AM 12/4/2024     8:32 AM   RUBENS-7 SCORE   Total Score 3 (minimal anxiety) 6 (mild anxiety) 2 (minimal anxiety)   Total Score 3 6 2        Patient-reported         Suicide Assessment Five-step Evaluation and Treatment (SAFE-T)          Review of Systems  Constitutional, HEENT, cardiovascular, pulmonary, gi and gu systems are negative, except as otherwise noted.      Objective           Vitals:  No vitals were obtained today due to virtual visit.    Physical Exam   GENERAL: alert and no distress  EYES: Eyes grossly normal to inspection.  No discharge or erythema, or obvious scleral/conjunctival abnormalities.  RESP: No audible wheeze, cough, or visible cyanosis.    SKIN: Visible skin clear. No significant rash, abnormal pigmentation or lesions.  NEURO: Cranial nerves grossly intact.  Mentation and speech appropriate for age.  PSYCH: Appropriate affect, tone, and pace of words          Video-Visit Details    Type of service:  Video Visit   Originating Location (pt. Location): Home    Distant Location (provider location):  Off-site  Platform used for Video Visit: [employer]  Signed Electronically by: Navya Degroot MD

## 2024-12-04 NOTE — PATIENT INSTRUCTIONS
Increase Escitalopram to 20 mg daily.  Follow up  in 4-6 weeks if symptoms are not well controlled on this dosage.    You can take this at night to see if that diminishes the nausea side effect you have noted.

## 2025-06-14 DIAGNOSIS — F41.9 ANXIETY: ICD-10-CM

## 2025-06-16 RX ORDER — ESCITALOPRAM OXALATE 20 MG/1
20 TABLET ORAL DAILY
Qty: 30 TABLET | Refills: 0 | Status: SHIPPED | OUTPATIENT
Start: 2025-06-16

## 2025-07-17 DIAGNOSIS — F41.9 ANXIETY: ICD-10-CM

## 2025-07-17 NOTE — TELEPHONE ENCOUNTER
Patient needs to be seen by Navya Degroot MD  (provider or resource)  Is there a time frame in which the patient should be seen Yes: within month.  In person recommended since last in person visit June 2023.  Ok for same day/hospital follow up visit if needed  What does the patient need to be seen regarding mood - ok to do with annual   Does patient need to come fasting No  Phone: [phone] (home)  When workflow completed Route to provider if refill needed to get to follow up appointment.    Clinic: [facility] at [phone]    'Hi, this is (your name) I am calling from (provider's name) office. After reviewing your chart, (Provider's name) has indicated that you need an appointment to review (reason for visit). May I assist you in making this appointment? (Please contact us via [employer] or by phone at (Clinic name and Phone number) to schedule this appointment at your earliest convenience)'    Was first outreach attempted?No  If yes, the Second attempt due on:

## 2025-07-22 RX ORDER — ESCITALOPRAM OXALATE 20 MG/1
20 TABLET ORAL DAILY
Qty: 90 TABLET | Refills: 0 | Status: SHIPPED | OUTPATIENT
Start: 2025-07-22

## 2025-07-22 NOTE — TELEPHONE ENCOUNTER
Patient contacted and scheduled per Navya Degroot MD:    Future Appointments 7/22/2025 - 1/18/2026        Date Visit Type Length Department Provider     7/24/2025 11:20 AM OFFICE VISIT 20 min BA FM/IM/Navya Johnson MD    Location Instructions:     [facility] is located at [address] in [place]. This is just west of the [facility] exit off of [address] [place] [address]. Free parking is available; from [facility], access the lot by turning onto [address] or [address].                     Hilaria Dee, RN, BSN, PHN